# Patient Record
Sex: FEMALE | Race: WHITE | NOT HISPANIC OR LATINO | Employment: OTHER | URBAN - NONMETROPOLITAN AREA
[De-identification: names, ages, dates, MRNs, and addresses within clinical notes are randomized per-mention and may not be internally consistent; named-entity substitution may affect disease eponyms.]

---

## 2024-10-23 ENCOUNTER — OFFICE VISIT (OUTPATIENT)
Dept: CARDIOLOGY | Facility: CLINIC | Age: 79
End: 2024-10-23
Payer: MEDICARE

## 2024-10-23 VITALS
HEART RATE: 106 BPM | SYSTOLIC BLOOD PRESSURE: 135 MMHG | OXYGEN SATURATION: 87 % | WEIGHT: 161 LBS | BODY MASS INDEX: 28.53 KG/M2 | DIASTOLIC BLOOD PRESSURE: 71 MMHG | HEIGHT: 63 IN

## 2024-10-23 DIAGNOSIS — I70.0 AORTIC ATHEROSCLEROSIS: Primary | ICD-10-CM

## 2024-10-23 DIAGNOSIS — E78.2 MIXED HYPERLIPIDEMIA: Chronic | ICD-10-CM

## 2024-10-23 DIAGNOSIS — I25.10 CORONARY ARTERY DISEASE DUE TO CALCIFIED CORONARY LESION: ICD-10-CM

## 2024-10-23 DIAGNOSIS — Z87.891 FORMER SMOKER: ICD-10-CM

## 2024-10-23 DIAGNOSIS — I25.84 CORONARY ARTERY DISEASE DUE TO CALCIFIED CORONARY LESION: ICD-10-CM

## 2024-10-23 DIAGNOSIS — I10 BENIGN ESSENTIAL HYPERTENSION: ICD-10-CM

## 2024-10-23 DIAGNOSIS — I65.23 BILATERAL CAROTID ARTERY STENOSIS: ICD-10-CM

## 2024-10-23 DIAGNOSIS — R06.02 SOB (SHORTNESS OF BREATH): ICD-10-CM

## 2024-10-23 PROBLEM — K21.9 GASTROESOPHAGEAL REFLUX DISEASE: Status: ACTIVE | Noted: 2024-01-03

## 2024-10-23 PROCEDURE — 3075F SYST BP GE 130 - 139MM HG: CPT | Performed by: INTERNAL MEDICINE

## 2024-10-23 PROCEDURE — 93000 ELECTROCARDIOGRAM COMPLETE: CPT | Performed by: INTERNAL MEDICINE

## 2024-10-23 PROCEDURE — 99204 OFFICE O/P NEW MOD 45 MIN: CPT | Performed by: INTERNAL MEDICINE

## 2024-10-23 PROCEDURE — 3078F DIAST BP <80 MM HG: CPT | Performed by: INTERNAL MEDICINE

## 2024-10-23 RX ORDER — ATORVASTATIN CALCIUM 80 MG/1
80 TABLET, FILM COATED ORAL DAILY
COMMUNITY

## 2024-10-23 RX ORDER — PHENOL 1.4 %
1 AEROSOL, SPRAY (ML) MUCOUS MEMBRANE DAILY
COMMUNITY

## 2024-10-23 RX ORDER — AZELASTINE HYDROCHLORIDE 137 UG/1
SPRAY, METERED NASAL
COMMUNITY
Start: 2024-07-30

## 2024-10-23 RX ORDER — ALBUTEROL SULFATE 0.83 MG/ML
2.5 SOLUTION RESPIRATORY (INHALATION) EVERY 4 HOURS PRN
COMMUNITY

## 2024-10-23 RX ORDER — FLUTICASONE FUROATE, UMECLIDINIUM BROMIDE AND VILANTEROL TRIFENATATE 200; 62.5; 25 UG/1; UG/1; UG/1
1 POWDER RESPIRATORY (INHALATION)
COMMUNITY
Start: 2024-10-09

## 2024-10-23 RX ORDER — BENZONATATE 100 MG/1
100 CAPSULE ORAL 3 TIMES DAILY PRN
COMMUNITY

## 2024-10-23 RX ORDER — LISINOPRIL 40 MG/1
40 TABLET ORAL DAILY
COMMUNITY
Start: 2024-08-16

## 2024-10-23 RX ORDER — ASPIRIN 325 MG
325 TABLET, DELAYED RELEASE (ENTERIC COATED) ORAL DAILY
COMMUNITY

## 2024-10-23 RX ORDER — ALBUTEROL SULFATE 90 UG/1
1 INHALANT RESPIRATORY (INHALATION) EVERY 4 HOURS PRN
COMMUNITY

## 2024-10-23 RX ORDER — GUAIFENESIN 600 MG/1
600 TABLET, EXTENDED RELEASE ORAL 2 TIMES DAILY
COMMUNITY

## 2024-10-23 NOTE — PROGRESS NOTES
Subjective   Gin Mccoy is a 79 y.o. female     Chief Complaint   Patient presents with    Establish Care     Here for eval. Per Dr. Lynn. Due to Ct chest with aortic and CA atheroscler.     Coronary Artery Disease    Shortness of Breath       PROBLEM LIST:     0. Aortic and coronary artery atherosclerosis per CT chest at [facility] on 8-  GIDEON / PAD  HTN  Hyperlipidemia  COPD, followed by Dr. Lynn.   GERD  Former smoker    Denies Rheumatic / Scarlet fever    Specialty Problems    None        HPI:  Ms. Gin Mccoy is a 79-year-old patient of Tashi Perez seen today to establish care and for evaluation of abnormal CT findings.    Ms. Mccoy has chronic obstructive pulmonary disease and has a longstanding history of tobacco use.  However, she states she stopped smoking cigarettes 10 to 15 years ago.  A low-dose CT scan of the chest demonstrated coronary artery and aortic calcification.  Ms. Perez denies chest pain, orthopnea, PND, or lower extremity edema.  She has class III functional capacity and is limited primarily by her chronic lung disease.  She describes no recent change in her physical capacity.    Records review reveals a carotid duplex study that reported nonobstructive disease bilaterally.  The patient relates having a pharmacologic stress test in the distant past but never had cardiac catheterization.  She does not claudicate.  She has had no symptoms of TIA or stroke.                    PRIOR MEDICATIONS    Current Outpatient Medications on File Prior to Visit   Medication Sig Dispense Refill    albuterol (PROVENTIL) (2.5 MG/3ML) 0.083% nebulizer solution Take 2.5 mg by nebulization Every 4 (Four) Hours As Needed.      albuterol sulfate  (90 Base) MCG/ACT inhaler Inhale 1 puff Every 4 (Four) Hours As Needed.      aspirin 325 MG EC tablet Take 1 tablet by mouth Daily.      atorvastatin (LIPITOR) 80 MG tablet Take 1 tablet by mouth Daily.      Azelastine HCl 137 MCG/SPRAY solution prn       benzonatate (TESSALON) 100 MG capsule Take 1 capsule by mouth 3 (Three) Times a Day As Needed for Cough.      esomeprazole (nexIUM) 20 MG capsule Take 1 capsule by mouth Every Morning Before Breakfast.      lisinopril (PRINIVIL,ZESTRIL) 40 MG tablet Take 1 tablet by mouth Daily.      Melatonin 10 MG tablet Take 1 tablet by mouth Daily.      O2 (OXYGEN) Inhale 2 L/min 1 (One) Time.      Trelegy Ellipta 200-62.5-25 MCG/ACT inhaler Inhale 1 puff Daily.       No current facility-administered medications on file prior to visit.       ALLERGIES:    Penicillins and Sulfa antibiotics    PAST MEDICAL HISTORY:    Past Medical History:   Diagnosis Date    Aortic atherosclerosis     Carotid artery stenosis     COPD (chronic obstructive pulmonary disease)     Coronary artery disease     Hyperlipidemia     Hypertension     PAD (peripheral artery disease)        SURGICAL HISTORY:    Past Surgical History:   Procedure Laterality Date    HYSTERECTOMY      partial    LEG SURGERY Right     titanoium tyler       SOCIAL HISTORY:    Social History     Socioeconomic History    Marital status:    Tobacco Use    Smoking status: Former     Types: Cigarettes    Smokeless tobacco: Never    Tobacco comments:     Used to smoke 1 PPD for approx. 40 yrs.    Substance and Sexual Activity    Alcohol use: Never    Drug use: Never       FAMILY HISTORY:    Family History   Problem Relation Age of Onset    No Known Problems Mother        Review of Systems   Constitutional:  Positive for fatigue (easily). Negative for chills, diaphoresis, fever and unexpected weight change.   HENT: Negative.     Eyes: Negative.    Respiratory:  Positive for shortness of breath (wears 02).    Cardiovascular: Negative.    Gastrointestinal:  Positive for blood in stool (denies melena,hemoptysis, eval. in hosp.). Negative for constipation and diarrhea.   Endocrine: Negative for cold intolerance and heat intolerance.   Genitourinary: Negative.    Musculoskeletal:   "Positive for arthralgias and myalgias.   Skin: Negative.    Allergic/Immunologic: Positive for environmental allergies.   Neurological: Negative.         Denies stroke like symptoms   Hematological:  Bruises/bleeds easily.   Psychiatric/Behavioral: Negative.         VISIT VITALS:  Vitals:    10/23/24 1414   BP: 135/71   BP Location: Left arm   Patient Position: Sitting   Pulse: 106   SpO2: (!) 87%   Weight: 73 kg (161 lb)   Height: 160 cm (63\")      /71 (BP Location: Left arm, Patient Position: Sitting)   Pulse 106   Ht 160 cm (63\")   Wt 73 kg (161 lb)   SpO2 (!) 87% Comment: on 02 at 2.5 L via N/C  BMI 28.52 kg/m²     RECENT LABS:    Objective       Physical Exam  Vitals and nursing note reviewed.   Constitutional:       General: She is not in acute distress.     Appearance: She is well-developed.   HENT:      Head: Normocephalic and atraumatic.   Eyes:      Conjunctiva/sclera: Conjunctivae normal.      Pupils: Pupils are equal, round, and reactive to light.   Neck:      Vascular: No carotid bruit, hepatojugular reflux or JVD.      Trachea: No tracheal deviation.      Comments: Nl. Carotid upstrokes  Cardiovascular:      Rate and Rhythm: Regular rhythm. Tachycardia present.      Pulses:           Radial pulses are 2+ on the right side and 2+ on the left side.      Heart sounds: Normal heart sounds, S1 normal and S2 normal. Heart sounds are distant (mildly). No murmur heard.     No friction rub. No S3 or S4 sounds.   Pulmonary:      Effort: Pulmonary effort is normal.      Breath sounds: Decreased breath sounds (moderately) present. No wheezing, rhonchi or rales.      Comments: Mildly increased Expir. Phase  Nl. Breath sound intensity      Abdominal:      General: Bowel sounds are normal. There is no distension or abdominal bruit.      Palpations: Abdomen is soft. There is no mass.      Tenderness: There is no abdominal tenderness. There is no guarding or rebound.      Comments: No organomegaly "   Musculoskeletal:         General: No tenderness or deformity. Normal range of motion.      Cervical back: Normal range of motion and neck supple.      Right lower leg: No edema.      Left lower leg: No edema.      Comments: LLE, no edema, unable to palpitate pedal pulses, cap. Refill 3.6 sec's  RLE, no edema, unable to palpitate pedal pulses,  nl. cap. Refill    Skin:     General: Skin is warm and dry.      Coloration: Skin is not pale.      Findings: No erythema or rash.   Neurological:      Mental Status: She is alert and oriented to person, place, and time.   Psychiatric:         Behavior: Behavior normal.         Thought Content: Thought content normal.         Judgment: Judgment normal.           ECG 12 Lead    Date/Time: 10/23/2024 2:27 PM  Performed by: Renny Houston MD    Authorized by: Renny Houston MD  Previous ECG: no previous ECG available  Comments: Sinus at 106 bpm.  Occasional PVCs.  Possible right atrial enlargement.  RSR prime in V1.  No prior for comparison            Assessment & Plan   #1.  Coronary artery disease manifest by coronary artery calcification on low-dose CT scanning.  The patient describes no angina but has class III functional capacity because of chronic lung disease.  We will perform pharmacologic stress testing to exclude high risk markers from the standpoint of ischemia.    2.  Controlled systemic hypertension.    3.  Tachycardia.  The patient relates that she has had heart rates in the range of 100 bpm for many years.  As the patient is in sinus mechanism today we will defer further evaluation pending findings of above and clinical course.    4.  Treated dyslipidemia.  Fasting lipid profile from January of this year demonstrated total cholesterol 170, triglycerides of 90, HDL of 72, and LDL of 82.  With known peripheral arterial disease and coronary artery disease target LDL cholesterol should be 70 or less.  We will withhold change in therapy until lipids can be  rechecked through Dr. Perez's office.    5.  Peripheral arterial disease with nonobstructive carotid disease described by previous CTA    6.  Ms. Mccoy will follow with Dr. Perez as instructed and with Dr. Covarrubias as scheduled and we will plan on seeing her in follow-up in 6 to 12 months or on a as needed basis for stress test findings, symptoms, or review of prior records as discussed at today's visit.   Diagnosis Plan   1. Aortic atherosclerosis        2. Benign essential hypertension        3. Bilateral carotid artery stenosis        4. Coronary artery disease due to calcified coronary lesion        5. Mixed hyperlipidemia        6. Former smoker        7. SOB (shortness of breath)            No follow-ups on file.         Gin Mccoy  reports that she has quit smoking. Her smoking use included cigarettes. She has never used smokeless tobacco. I have educated her on the risk of diseases from using tobacco products such as cancer, COPD, and heart disease.       Advance Care Planning   ACP discussion was held with the patient during this visit. Patient has an advance directive (not in EMR), copy requested.            BMI is >= 25 and <30. (Overweight) The following options were offered after discussion;: pcp addressing               Electronically signed by:    Scribed for Renny Houston MD by Maria Elena Aggarwal LPN on October 23, 2024  at 14:27 EDT    I, Renny Houston MD personally performed the services described in this documentation as scribed by the above named individual in my presence, and it is both accurate and complete. October 23, 2024 14:27 EDT      Dictated Utilizing Dragon Dictation: Part of this note may be an electronic transcription/translation of spoken language to printed text using the Dragon Dictation System.

## 2024-12-09 ENCOUNTER — HOSPITAL ENCOUNTER (OUTPATIENT)
Dept: CARDIOLOGY | Facility: HOSPITAL | Age: 79
Discharge: HOME | End: 2024-12-09
Payer: MEDICARE

## 2024-12-09 DIAGNOSIS — R06.02 SOB (SHORTNESS OF BREATH): ICD-10-CM

## 2024-12-09 DIAGNOSIS — I65.23 BILATERAL CAROTID ARTERY STENOSIS: ICD-10-CM

## 2024-12-09 DIAGNOSIS — I70.0 AORTIC ATHEROSCLEROSIS: ICD-10-CM

## 2024-12-09 DIAGNOSIS — Z87.891 FORMER SMOKER: ICD-10-CM

## 2024-12-09 DIAGNOSIS — I25.84 CORONARY ARTERY DISEASE DUE TO CALCIFIED CORONARY LESION: ICD-10-CM

## 2024-12-09 DIAGNOSIS — I25.10 CORONARY ARTERY DISEASE DUE TO CALCIFIED CORONARY LESION: ICD-10-CM

## 2024-12-09 DIAGNOSIS — I10 BENIGN ESSENTIAL HYPERTENSION: ICD-10-CM

## 2024-12-09 DIAGNOSIS — E78.2 MIXED HYPERLIPIDEMIA: Chronic | ICD-10-CM

## 2024-12-09 PROCEDURE — A9500 TC99M SESTAMIBI: HCPCS | Performed by: INTERNAL MEDICINE

## 2024-12-09 PROCEDURE — 34310000005 TECHNETIUM SESTAMIBI: Performed by: INTERNAL MEDICINE

## 2024-12-09 PROCEDURE — 25010000002 REGADENOSON 0.4 MG/5ML SOLUTION: Performed by: INTERNAL MEDICINE

## 2024-12-09 PROCEDURE — 78452 HT MUSCLE IMAGE SPECT MULT: CPT

## 2024-12-09 PROCEDURE — 93017 CV STRESS TEST TRACING ONLY: CPT

## 2024-12-09 RX ORDER — REGADENOSON 0.08 MG/ML
0.4 INJECTION, SOLUTION INTRAVENOUS
Status: COMPLETED | OUTPATIENT
Start: 2024-12-09 | End: 2024-12-09

## 2024-12-09 RX ADMIN — TECHNETIUM TC 99M SESTAMIBI 1 DOSE: 1 INJECTION INTRAVENOUS at 09:05

## 2024-12-09 RX ADMIN — TECHNETIUM TC 99M SESTAMIBI 1 DOSE: 1 INJECTION INTRAVENOUS at 10:23

## 2024-12-09 RX ADMIN — REGADENOSON 0.4 MG: 0.08 INJECTION, SOLUTION INTRAVENOUS at 10:23

## 2024-12-10 LAB
BH CV REST NUCLEAR ISOTOPE DOSE: 10 MCI
BH CV STRESS COMMENTS STAGE 1: NORMAL
BH CV STRESS DOSE REGADENOSON STAGE 1: 0.4
BH CV STRESS DURATION MIN STAGE 1: 0
BH CV STRESS DURATION SEC STAGE 1: 10
BH CV STRESS NUCLEAR ISOTOPE DOSE: 30 MCI
BH CV STRESS PROTOCOL 1: NORMAL
BH CV STRESS RECOVERY BP: NORMAL MMHG
BH CV STRESS RECOVERY HR: 98 BPM
BH CV STRESS STAGE 1: 1
MAXIMAL PREDICTED HEART RATE: 143 BPM
PERCENT MAX PREDICTED HR: 79.72 %
STRESS BASELINE BP: NORMAL MMHG
STRESS BASELINE HR: 80 BPM
STRESS PERCENT HR: 94 %
STRESS POST PEAK BP: NORMAL MMHG
STRESS POST PEAK HR: 114 BPM
STRESS TARGET HR: 122 BPM

## 2024-12-12 ENCOUNTER — TELEPHONE (OUTPATIENT)
Dept: CARDIOLOGY | Facility: CLINIC | Age: 79
End: 2024-12-12
Payer: MEDICARE

## 2024-12-12 NOTE — TELEPHONE ENCOUNTER
----- Message from Renny Houtson sent at 12/11/2024 12:56 PM EST -----  Equivocal stress test findings with no high risk markers.  Patient instructed to have a follow-up appointment within 1 month.  ----- Message -----  From: Renny Houston MD  Sent: 12/10/2024   3:12 PM EST  To: Renny Houston MD

## 2024-12-18 ENCOUNTER — OFFICE VISIT (OUTPATIENT)
Dept: CARDIOLOGY | Facility: CLINIC | Age: 79
End: 2024-12-18
Payer: MEDICARE

## 2024-12-18 VITALS
HEIGHT: 63 IN | SYSTOLIC BLOOD PRESSURE: 151 MMHG | BODY MASS INDEX: 29.48 KG/M2 | DIASTOLIC BLOOD PRESSURE: 84 MMHG | HEART RATE: 114 BPM | OXYGEN SATURATION: 96 % | WEIGHT: 166.4 LBS

## 2024-12-18 DIAGNOSIS — I25.84 CORONARY ARTERY DISEASE DUE TO CALCIFIED CORONARY LESION: ICD-10-CM

## 2024-12-18 DIAGNOSIS — J44.9 COPD, SEVERE: Chronic | ICD-10-CM

## 2024-12-18 DIAGNOSIS — I65.23 BILATERAL CAROTID ARTERY STENOSIS: ICD-10-CM

## 2024-12-18 DIAGNOSIS — R06.02 SOB (SHORTNESS OF BREATH): Primary | ICD-10-CM

## 2024-12-18 DIAGNOSIS — I10 BENIGN ESSENTIAL HYPERTENSION: ICD-10-CM

## 2024-12-18 DIAGNOSIS — I25.10 CORONARY ARTERY DISEASE DUE TO CALCIFIED CORONARY LESION: ICD-10-CM

## 2024-12-18 DIAGNOSIS — E78.2 MIXED HYPERLIPIDEMIA: ICD-10-CM

## 2024-12-18 PROCEDURE — 3079F DIAST BP 80-89 MM HG: CPT | Performed by: CLINICAL NURSE SPECIALIST

## 2024-12-18 PROCEDURE — 3077F SYST BP >= 140 MM HG: CPT | Performed by: CLINICAL NURSE SPECIALIST

## 2024-12-18 PROCEDURE — 99214 OFFICE O/P EST MOD 30 MIN: CPT | Performed by: CLINICAL NURSE SPECIALIST

## 2024-12-18 RX ORDER — LEVOFLOXACIN 750 MG/1
750 TABLET, FILM COATED ORAL
COMMUNITY
Start: 2024-12-17

## 2024-12-18 RX ORDER — PREDNISONE 10 MG/1
10 TABLET ORAL
COMMUNITY
Start: 2024-12-17

## 2024-12-18 NOTE — PROGRESS NOTES
Subjective     Gin Mccoy is a 79 y.o. female who presents today for Follow-up (Stress test follow up ), Palpitations, Shortness of Breath, and Edema.    CHIEF COMPLIANT  Chief Complaint   Patient presents with    Follow-up     Stress test follow up     Palpitations    Shortness of Breath    Edema       Active Problems:  0. Aortic and coronary artery atherosclerosis per CT chest at [facility] on 8-  0.1 Nuclear stress 12/9/24: a small, mild, partially reversible defect confined to the apex. Findings are felt most compatible with tissue attenuation.  ejection fraction of 72% with no focal wall motion abnormalities.   GIDEON / PAD  HTN  Hyperlipidemia  COPD, followed by Dr. Lynn.   GERD  Former smoker     Denies Rheumatic / Scarlet fever    HPI  The patient is a 79-year-old female that returns for follow-up to discuss recent testing.  The patient was originally referred due to a CT scan that showed coronary calcification.  She was scheduled for a nuclear stress test on 12/9/2024 that showed a small, mild, partially reversible defect confined to the apex with was felt to most compatible with tissue attenuation.  The patient denies chest pain.  She does continue to have some dyspnea.  She does have baseline dyspnea and wears oxygen due to COPD.  She does follow with pulmonology.  The patient does feel like dyspnea has been a little worse recently.  She states she followed up with Dr. Perez yesterday who put her on medication due to COPD exacerbation.  The patient states she has noted some mild increasing lower extremity edema over the past week but that she has been less active because of her COPD.  She denies syncope or near syncope.  She states overall for her age she feels she is doing pretty well.  Her blood pressure is a little elevated in the office but she states it typically maintains within an acceptable range at home.    PRIOR MEDS  Current Outpatient Medications on File Prior to Visit   Medication Sig  Dispense Refill    albuterol (PROVENTIL) (2.5 MG/3ML) 0.083% nebulizer solution Take 2.5 mg by nebulization Every 4 (Four) Hours As Needed.      albuterol sulfate  (90 Base) MCG/ACT inhaler Inhale 1 puff Every 4 (Four) Hours As Needed.      aspirin 325 MG EC tablet Take 1 tablet by mouth Daily.      atorvastatin (LIPITOR) 80 MG tablet Take 1 tablet by mouth Daily.      Azelastine HCl 137 MCG/SPRAY solution prn      benzonatate (TESSALON) 100 MG capsule Take 1 capsule by mouth 3 (Three) Times a Day As Needed for Cough.      esomeprazole (nexIUM) 20 MG capsule Take 1 capsule by mouth Every Morning Before Breakfast.      guaiFENesin (MUCINEX) 600 MG 12 hr tablet Take 1 tablet by mouth 2 (Two) Times a Day.      levoFLOXacin (LEVAQUIN) 750 MG tablet Take 1 tablet by mouth.      lisinopril (PRINIVIL,ZESTRIL) 40 MG tablet Take 1 tablet by mouth Daily.      Melatonin 10 MG tablet Take 1 tablet by mouth Daily.      O2 (OXYGEN) Inhale 2 L/min 1 (One) Time.      predniSONE (DELTASONE) 10 MG tablet Take 1 tablet by mouth.      Trelegy Ellipta 200-62.5-25 MCG/ACT inhaler Inhale 1 puff Daily.       No current facility-administered medications on file prior to visit.       ALLERGIES  Penicillins and Sulfa antibiotics    HISTORY  Past Medical History:   Diagnosis Date    Aortic atherosclerosis     Carotid artery stenosis     COPD (chronic obstructive pulmonary disease)     Coronary artery disease     Hyperlipidemia     Hypertension     Nephrolithiasis     PAD (peripheral artery disease)        Social History     Socioeconomic History    Marital status:    Tobacco Use    Smoking status: Former     Types: Cigarettes    Smokeless tobacco: Never    Tobacco comments:     Used to smoke 1 PPD for approx. 40 yrs.    Substance and Sexual Activity    Alcohol use: Never    Drug use: Never       Family History   Problem Relation Age of Onset    No Known Problems Mother        Review of Systems   Constitutional:  Positive for  "fatigue. Negative for chills, diaphoresis and fever.   HENT: Negative.     Eyes: Negative.  Negative for visual disturbance.   Respiratory:  Positive for shortness of breath (02 @2.5 L daily, short of breath still with any activity) and wheezing. Negative for apnea (2.5 L 02), cough and chest tightness.    Cardiovascular:  Positive for palpitations (pounding sensation) and leg swelling (started after stress test, does not go down overnight). Negative for chest pain.   Gastrointestinal: Negative.  Negative for abdominal pain, blood in stool, constipation, diarrhea, nausea and vomiting.   Endocrine: Negative.    Genitourinary: Negative.  Negative for hematuria.   Musculoskeletal:  Positive for arthralgias (hip) and gait problem (uses walker). Negative for back pain, myalgias and neck pain.   Skin: Negative.    Allergic/Immunologic: Negative.    Neurological:  Positive for weakness. Negative for dizziness, syncope, light-headedness, numbness and headaches.   Hematological:  Bruises/bleeds easily.   Psychiatric/Behavioral: Negative.  Negative for sleep disturbance.        Objective     VITALS: /84 (BP Location: Left arm, Patient Position: Sitting)   Pulse 114   Ht 160 cm (63\")   Wt 75.5 kg (166 lb 6.4 oz)   SpO2 96% Comment: 2.5 L 02  BMI 29.48 kg/m²     LABS:   Lab Results (most recent)       None            IMAGING:   No Images in the past 120 days found..    EXAM:  Physical Exam  Constitutional:       Appearance: Normal appearance.   Eyes:      Pupils: Pupils are equal, round, and reactive to light.   Cardiovascular:      Rate and Rhythm: Normal rate and regular rhythm.      Pulses:           Carotid pulses are 2+ on the right side and 2+ on the left side.       Radial pulses are 2+ on the right side and 2+ on the left side.        Dorsalis pedis pulses are 1+ on the right side and 1+ on the left side.        Posterior tibial pulses are 1+ on the right side and 1+ on the left side.      Heart sounds: " Normal heart sounds.   Pulmonary:      Effort: Pulmonary effort is normal.      Breath sounds: Examination of the right-upper field reveals wheezing. Examination of the left-upper field reveals wheezing. Examination of the right-lower field reveals decreased breath sounds. Examination of the left-lower field reveals decreased breath sounds. Decreased breath sounds and wheezing present.   Abdominal:      General: Bowel sounds are normal.      Palpations: Abdomen is soft.   Musculoskeletal:      Right lower leg: Edema present.      Left lower leg: Edema present.   Skin:     General: Skin is warm and dry.      Capillary Refill: Capillary refill takes less than 2 seconds.   Neurological:      General: No focal deficit present.      Mental Status: She is alert and oriented to person, place, and time.   Psychiatric:         Mood and Affect: Mood normal.         Thought Content: Thought content normal.         Procedure   Procedures       Assessment & Plan    Diagnosis Plan   1. SOB (shortness of breath)  Adult Transthoracic Echo Complete W/ Cont if Necessary Per Protocol      2. Coronary artery disease due to calcified coronary lesion        3. Mixed hyperlipidemia        4. COPD, severe        5. Benign essential hypertension        6. Bilateral carotid artery stenosis          Plan:  1.  Shortness of breath: The patient does have baseline dyspnea due to severe COPD.  She does wear oxygen.  She does feel her dyspnea has been a little worse over the past week.  She did follow-up with primary care yesterday whom she states started her on medication for COPD exacerbation.  She has not had an echocardiogram in multiple years.  Will check an echocardiogram to evaluate LV function and structural anatomy due to worsening dyspnea and some mild lower extremity edema.  Will call her these results.  If there is a significant abnormality we will bring her back in the office to review results.  If not we will keep her follow-up as  scheduled in October with Dr. Houston.  2.  CAD: No high risk markers on recent stress test.  Continue medical management including aspirin and statin.  She was advised to notify our office if symptoms of chest pain develop.  3.  Mixed hyperlipidemia: Continue statin.  Will periodically review lipid panel.  4.  Essential hypertension: Blood pressure is a little elevated in the office today but the patient states it does not typically run elevated at home.  Continue current medications for now.  The patient was instructed to monitor BP at home and to notify our office if systolic BP is consistently greater than 130-135 systolic.  Goal BP is 130-135/70-80.  5.  Bilateral carotid artery stenosis: Continue aspirin and statin.  Return for keep follow up as scheduled .    Gin Mccoy  reports that she has quit smoking. Her smoking use included cigarettes. She has never used smokeless tobacco.        Advance Care Planning  ACP discussion was held with the patient during this visit. Patient has an advance directive (not in EMR), copy requested.          MEDS ORDERED DURING VISIT:  No orders of the defined types were placed in this encounter.      DISCONTINUED MEDS DURING VISIT:   There are no discontinued medications.       This document has been electronically signed by SAUMYA Ramos  December 18, 2024 11:33 EST    Dictated Utilizing Dragon Dictation: Part of this note may be an electronic transcription/translation of spoken language to printed text using the Dragon Dictation System

## 2025-01-17 ENCOUNTER — HOSPITAL ENCOUNTER (OUTPATIENT)
Dept: CARDIOLOGY | Facility: HOSPITAL | Age: 80
Discharge: HOME | End: 2025-01-17
Payer: MEDICARE

## 2025-01-17 DIAGNOSIS — R06.02 SOB (SHORTNESS OF BREATH): ICD-10-CM

## 2025-01-17 LAB
AV MEAN PRESS GRAD SYS DOP V1V2: 3.7 MMHG
AV VMAX SYS DOP: 129 CM/SEC
BH CV ECHO MEAS - ACS: 2.03 CM
BH CV ECHO MEAS - AI P1/2T: 352.1 MSEC
BH CV ECHO MEAS - AO MAX PG: 6.7 MMHG
BH CV ECHO MEAS - AO ROOT DIAM: 3 CM
BH CV ECHO MEAS - AO V2 VTI: 24.3 CM
BH CV ECHO MEAS - EDV(CUBED): 40.7 ML
BH CV ECHO MEAS - EDV(MOD-SP4): 66.7 ML
BH CV ECHO MEAS - EF(MOD-SP4): 55.2 %
BH CV ECHO MEAS - ESV(CUBED): 10.2 ML
BH CV ECHO MEAS - ESV(MOD-SP4): 29.9 ML
BH CV ECHO MEAS - FS: 36.9 %
BH CV ECHO MEAS - IVS/LVPW: 0.8 CM
BH CV ECHO MEAS - IVSD: 1.26 CM
BH CV ECHO MEAS - LA DIMENSION: 2.46 CM
BH CV ECHO MEAS - LAT PEAK E' VEL: 5.5 CM/SEC
BH CV ECHO MEAS - LV DIASTOLIC VOL/BSA (35-75): 37.3 CM2
BH CV ECHO MEAS - LV MASS(C)D: 171.8 GRAMS
BH CV ECHO MEAS - LV SYSTOLIC VOL/BSA (12-30): 16.7 CM2
BH CV ECHO MEAS - LVIDD: 3.4 CM
BH CV ECHO MEAS - LVIDS: 2.17 CM
BH CV ECHO MEAS - LVPWD: 1.57 CM
BH CV ECHO MEAS - MED PEAK E' VEL: 5.2 CM/SEC
BH CV ECHO MEAS - MV A MAX VEL: 97.3 CM/SEC
BH CV ECHO MEAS - MV DEC TIME: 0.12 SEC
BH CV ECHO MEAS - MV E MAX VEL: 60 CM/SEC
BH CV ECHO MEAS - MV E/A: 0.62
BH CV ECHO MEAS - RAP SYSTOLE: 10 MMHG
BH CV ECHO MEAS - RVDD: 1.99 CM
BH CV ECHO MEAS - RVSP: 33.9 MMHG
BH CV ECHO MEAS - SV(MOD-SP4): 36.8 ML
BH CV ECHO MEAS - SVI(MOD-SP4): 20.6 ML/M2
BH CV ECHO MEAS - TR MAX PG: 23.9 MMHG
BH CV ECHO MEAS - TR MAX VEL: 244.3 CM/SEC
BH CV ECHO MEASUREMENTS AVERAGE E/E' RATIO: 11.21
LEFT ATRIUM VOLUME INDEX: 8.5 ML/M2

## 2025-01-17 PROCEDURE — 93306 TTE W/DOPPLER COMPLETE: CPT

## 2025-01-26 LAB
AV MEAN PRESS GRAD SYS DOP V1V2: 3.7 MMHG
AV VMAX SYS DOP: 129 CM/SEC
BH CV ECHO MEAS - ACS: 2.03 CM
BH CV ECHO MEAS - AI P1/2T: 352.1 MSEC
BH CV ECHO MEAS - AO MAX PG: 6.7 MMHG
BH CV ECHO MEAS - AO ROOT DIAM: 3 CM
BH CV ECHO MEAS - AO V2 VTI: 24.3 CM
BH CV ECHO MEAS - EDV(CUBED): 40.7 ML
BH CV ECHO MEAS - EDV(MOD-SP4): 66.7 ML
BH CV ECHO MEAS - EF(MOD-SP4): 55.2 %
BH CV ECHO MEAS - ESV(CUBED): 10.2 ML
BH CV ECHO MEAS - ESV(MOD-SP4): 29.9 ML
BH CV ECHO MEAS - FS: 36.9 %
BH CV ECHO MEAS - IVS/LVPW: 0.8 CM
BH CV ECHO MEAS - IVSD: 1.26 CM
BH CV ECHO MEAS - LA DIMENSION: 2.46 CM
BH CV ECHO MEAS - LAT PEAK E' VEL: 5.5 CM/SEC
BH CV ECHO MEAS - LV DIASTOLIC VOL/BSA (35-75): 37.3 CM2
BH CV ECHO MEAS - LV MASS(C)D: 171.8 GRAMS
BH CV ECHO MEAS - LV SYSTOLIC VOL/BSA (12-30): 16.7 CM2
BH CV ECHO MEAS - LVIDD: 3.4 CM
BH CV ECHO MEAS - LVIDS: 2.17 CM
BH CV ECHO MEAS - LVPWD: 1.57 CM
BH CV ECHO MEAS - MED PEAK E' VEL: 5.2 CM/SEC
BH CV ECHO MEAS - MV A MAX VEL: 97.3 CM/SEC
BH CV ECHO MEAS - MV DEC TIME: 0.12 SEC
BH CV ECHO MEAS - MV E MAX VEL: 60 CM/SEC
BH CV ECHO MEAS - MV E/A: 0.62
BH CV ECHO MEAS - RAP SYSTOLE: 10 MMHG
BH CV ECHO MEAS - RVDD: 1.99 CM
BH CV ECHO MEAS - RVSP: 33.9 MMHG
BH CV ECHO MEAS - SV(MOD-SP4): 36.8 ML
BH CV ECHO MEAS - SVI(MOD-SP4): 20.6 ML/M2
BH CV ECHO MEAS - TR MAX PG: 23.9 MMHG
BH CV ECHO MEAS - TR MAX VEL: 244.3 CM/SEC
BH CV ECHO MEASUREMENTS AVERAGE E/E' RATIO: 11.21
LEFT ATRIUM VOLUME INDEX: 8.5 ML/M2
LV EF 3D SEGMENTATION: 48 %

## 2025-01-29 ENCOUNTER — TELEPHONE (OUTPATIENT)
Dept: CARDIOLOGY | Facility: CLINIC | Age: 80
End: 2025-01-29
Payer: MEDICARE

## 2025-01-29 NOTE — TELEPHONE ENCOUNTER
----- Message from Janina Sow sent at 1/29/2025 11:16 AM EST -----  EF 60-65%, grade 1 diastolic dysfunctin.  Keep follow up